# Patient Record
(demographics unavailable — no encounter records)

---

## 2024-12-16 NOTE — REVIEW OF SYSTEMS
[Joint Pain] : joint pain [Joint Stiffness] : joint stiffness [Tingling (Paresthesia)] : tingling [Fever] : no fever [Headache] : no headache [Chills] : no chills [Feeling Fatigued] : not feeling fatigued [Blurry Vision] : no blurred vision [SOB] : no shortness of breath [Dyspnea on exertion] : not dyspnea during exertion [Chest Discomfort] : no chest discomfort [Lower Ext Edema] : no extremity edema [Palpitations] : no palpitations [Syncope] : no syncope [Cough] : no cough [Abdominal Pain] : no abdominal pain [Nausea] : no nausea [Rash] : no rash [Itching] : no itching [Dizziness] : no dizziness

## 2024-12-16 NOTE — HISTORY OF PRESENT ILLNESS
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 Patient was sent to [facility] for management. A dual chamber PPM (BSc) was implanted on 9/4/24. She presented two months ago for wound check and device interrogation which were all normal. She came for followup today reporting a frozen shoulder. She did not move her left arm much postop due to pain for over a month. now she is experiencing some neuropathy and left shoulder pain.  She had junctional escape in 30s, 97% V paced on remote checks. TTE 9/5/24 showed normal EF.  EKG 12/16/24: SR with 1st deg AVB, paced, rate 69

## 2024-12-16 NOTE — PHYSICAL EXAM
[Well Developed] : well developed [No Acute Distress] : no acute distress [Normal Conjunctiva] : normal conjunctiva [Normal S1, S2] : normal S1, S2 [No Murmur] : no murmur [Clear Lung Fields] : clear lung fields [Soft] : abdomen soft [Non Tender] : non-tender [No Rash] : no rash [Moves all extremities] : moves all extremities [No Focal Deficits] : no focal deficits [Alert and Oriented] : alert and oriented [de-identified] : pain with left shoulder abduction and adduction. limited shoulder ROM, but no numbness. +tingling intermittently. [de-identified] : chest incision clean, dry, intact, no swelling or active drainage. healing well

## 2024-12-16 NOTE — DISCUSSION/SUMMARY
Airway  Performed by: Steve Estrada MD  Authorized by: Steve Estrada MD     Final Airway Type:  Endotracheal airway  Final Endotracheal Airway*:  ETT  ETT Size (mm)*:  7.0  Cuff*:  Regular  Technique Used for Successful ETT Placement:  Video laryngoscopy  Devices/Methods Used in Placement*:  Oral ETT, Stylet and Mask  Intubation Procedure*:  ETCO2, Preoxygenation, Atraumatic, Dentition Unchanged and Pharynx Clear  Insertion Site:  Oral  Blade Type*:  Video Laryngoscope ([place])  Blade Size*:  3  Measured from*:  Lips  Secured at (cm)*:  22  Placement Verified by: auscultation, capnometry and equal breath sounds    Glottic View*:  1 - full view of glottis  Attempts*:  1  Ventilation Between Attempts:  None  Number of Other Approaches Attempted:  0   Patient Identified, Procedure confirmed, Emergency equipment available and Safety protocols followed  Location:  OR  Urgency:  Elective  Difficult Airway: No    Indications for Airway Management:  Anesthesia  Spontaneous Ventilation: present    Sedation Level:  Anesthetized  MILS Maintained Throughout: No    Mask Difficulty Assessment:  1 - vent by mask  Performed By:  Anesthesiologist  Anesthesiologist:  Steve Estrada MD  Start Time: 8/23/2022 8:54 AM     [EKG obtained to assist in diagnosis and management of assessed problem(s)] : EKG obtained to assist in diagnosis and management of assessed problem(s)

## 2024-12-16 NOTE — ASSESSMENT
[FreeTextEntry1] : 65 yo F with history of 2:1 AVB and trifascicular block now s/p DC PPM (BSc) implanted 9/4/24. Device interrogation was completed in September with normal device function- 97% V paced. Her most recent TTE in 9/2024 showed normal EF. Wound still appears well healed today. She has a frozen shoulder (L) due to not moving it for over a month after PPM implant. I advised her to contact her PCP for PT and some home exercise. She can follow up with Dr. Mclain going forward for device check every 6 months.  Give her high V pacing burden in setting of a normal DC PPM and not CSP pacing, recommend routine TTE to assess EF to make sure no pacing induced cardiomyopathy develops. Upgrade to BiV device may be considered if needed.  Remote monitor was set up. Follow up with me PRN